# Patient Record
Sex: FEMALE | Race: WHITE | Employment: FULL TIME | URBAN - METROPOLITAN AREA
[De-identification: names, ages, dates, MRNs, and addresses within clinical notes are randomized per-mention and may not be internally consistent; named-entity substitution may affect disease eponyms.]

---

## 2019-12-08 ENCOUNTER — HOSPITAL ENCOUNTER (EMERGENCY)
Facility: CLINIC | Age: 49
Discharge: HOME | End: 2019-12-08
Attending: EMERGENCY MEDICINE | Admitting: EMERGENCY MEDICINE
Payer: COMMERCIAL

## 2019-12-08 VITALS
WEIGHT: 118 LBS | HEART RATE: 88 BPM | DIASTOLIC BLOOD PRESSURE: 68 MMHG | HEIGHT: 61 IN | BODY MASS INDEX: 22.28 KG/M2 | RESPIRATION RATE: 18 BRPM | TEMPERATURE: 98.3 F | SYSTOLIC BLOOD PRESSURE: 123 MMHG | OXYGEN SATURATION: 100 %

## 2019-12-08 DIAGNOSIS — R42 DIZZINESS: ICD-10-CM

## 2019-12-08 DIAGNOSIS — R10.84 ABDOMINAL PAIN, GENERALIZED: ICD-10-CM

## 2019-12-08 LAB
ANION GAP SERPL CALCULATED.3IONS-SCNC: 7 MMOL/L (ref 3–14)
BASOPHILS # BLD AUTO: 0 10E9/L (ref 0–0.2)
BASOPHILS NFR BLD AUTO: 0.5 %
BUN SERPL-MCNC: 14 MG/DL (ref 7–30)
CALCIUM SERPL-MCNC: 8.5 MG/DL (ref 8.5–10.1)
CHLORIDE SERPL-SCNC: 107 MMOL/L (ref 94–109)
CO2 SERPL-SCNC: 24 MMOL/L (ref 20–32)
CREAT SERPL-MCNC: 0.85 MG/DL (ref 0.52–1.04)
D DIMER PPP FEU-MCNC: 0.4 UG/ML FEU (ref 0–0.5)
DIFFERENTIAL METHOD BLD: NORMAL
EOSINOPHIL # BLD AUTO: 0.1 10E9/L (ref 0–0.7)
EOSINOPHIL NFR BLD AUTO: 1.7 %
ERYTHROCYTE [DISTWIDTH] IN BLOOD BY AUTOMATED COUNT: 12.5 % (ref 10–15)
GFR SERPL CREATININE-BSD FRML MDRD: 80 ML/MIN/{1.73_M2}
GLUCOSE SERPL-MCNC: 96 MG/DL (ref 70–99)
HCT VFR BLD AUTO: 40.8 % (ref 35–47)
HGB BLD-MCNC: 13.8 G/DL (ref 11.7–15.7)
IMM GRANULOCYTES # BLD: 0 10E9/L (ref 0–0.4)
IMM GRANULOCYTES NFR BLD: 0.2 %
LYMPHOCYTES # BLD AUTO: 2.2 10E9/L (ref 0.8–5.3)
LYMPHOCYTES NFR BLD AUTO: 37.2 %
MCH RBC QN AUTO: 29.2 PG (ref 26.5–33)
MCHC RBC AUTO-ENTMCNC: 33.8 G/DL (ref 31.5–36.5)
MCV RBC AUTO: 86 FL (ref 78–100)
MONOCYTES # BLD AUTO: 0.5 10E9/L (ref 0–1.3)
MONOCYTES NFR BLD AUTO: 7.8 %
NEUTROPHILS # BLD AUTO: 3.2 10E9/L (ref 1.6–8.3)
NEUTROPHILS NFR BLD AUTO: 52.6 %
NRBC # BLD AUTO: 0 10*3/UL
NRBC BLD AUTO-RTO: 0 /100
PLATELET # BLD AUTO: 298 10E9/L (ref 150–450)
POTASSIUM SERPL-SCNC: 3.5 MMOL/L (ref 3.4–5.3)
RBC # BLD AUTO: 4.73 10E12/L (ref 3.8–5.2)
SODIUM SERPL-SCNC: 138 MMOL/L (ref 133–144)
WBC # BLD AUTO: 6 10E9/L (ref 4–11)

## 2019-12-08 PROCEDURE — 80048 BASIC METABOLIC PNL TOTAL CA: CPT | Performed by: EMERGENCY MEDICINE

## 2019-12-08 PROCEDURE — 99284 EMERGENCY DEPT VISIT MOD MDM: CPT | Mod: 25

## 2019-12-08 PROCEDURE — 93005 ELECTROCARDIOGRAM TRACING: CPT

## 2019-12-08 PROCEDURE — 85379 FIBRIN DEGRADATION QUANT: CPT | Performed by: EMERGENCY MEDICINE

## 2019-12-08 PROCEDURE — 85025 COMPLETE CBC W/AUTO DIFF WBC: CPT | Performed by: EMERGENCY MEDICINE

## 2019-12-08 ASSESSMENT — ENCOUNTER SYMPTOMS
NAUSEA: 1
CONFUSION: 1
ABDOMINAL PAIN: 1
DYSURIA: 0
FREQUENCY: 0
SHORTNESS OF BREATH: 0
BLOOD IN STOOL: 0
DIZZINESS: 1
VOMITING: 1

## 2019-12-08 ASSESSMENT — MIFFLIN-ST. JEOR: SCORE: 1102.62

## 2019-12-08 NOTE — ED AVS SNAPSHOT
Emergency Department  [address] [place] [address]  Phone:  [phone]  Fax:  [phone]                                    Carolin Atkinson   MRN: [id number]    Department:   Emergency Department   Date of Visit:  12/8/2019           After Visit Summary Signature Page    I have received my discharge instructions, and my questions have been answered. I have discussed any challenges I see with this plan with the nurse or doctor.    ..........................................................................................................................................  Patient/Patient Representative Signature      ..........................................................................................................................................  Patient Representative Print Name and Relationship to Patient    ..................................................               ................................................  Date                                   Time    ..........................................................................................................................................  Reviewed by Signature/Title    ...................................................              ..............................................  Date                                               Time          22EPIC Rev 08/18

## 2019-12-09 LAB — INTERPRETATION ECG - MUSE: NORMAL

## 2019-12-09 NOTE — ED PROVIDER NOTES
History     Chief Complaint:  Abdominal Pain      HPI   Carolin Atkinson is a 49 year old female, with a history of ulcerative colitis diagnosed in  s/p total colectomy with ileostomy in , who presents with abdominal pain which has been worsening over the last couple of years, becoming more frequent recently. Patient was in remission after her surgery with no other treatment until about two years ago. Recently, patient has had large amounts of output in her ileostomy bag which relieves the pain. She had an episode of confusion and dizziness tonight which prompted her visit. This has happened about four times in the last two months. She also endorses episodic nausea and vomiting which began over the last couple of months. Her colorectal surgeon ordered a MRI at clinic visit 1.5 weeks ago. She did cough tonight and produced a small amount of blood. She denies chest pain, shortness of breath, urinary changes and blood in her stool.     Cardiac Risk Factors   Sex: Female   Tobacco: Negative   Hypertension: Negative  Diabetes: Negative  Hyperlipidemia: Negative  Family History: Negative    PE/DVT Risk Factors   Personal History: Negative  Recent Travel: Negative   Recent Surgery/Hospitalization: Negative  Tobacco: Negative  Family History: Negative  Hormone Use: Positive  Cancer: Negative  Trauma: Negative      Allergies:  Erythromycin      Medications:    The patient is not currently taking any prescribed medications.     Past Medical History:    Arrhythmia (LBBB)  Ileostomy present  Ulcerative colitis     Past Surgical History:        Family History:    History reviewed. No pertinent family history.    Social History:  Presents to the ED by herself.   Tobacco Use: Never  Alcohol Use: No  PCP: Physician No Ref-Primary  Marital Status:   [2]     Review of Systems   Respiratory: Negative for shortness of breath.    Cardiovascular: Negative for chest pain.   Gastrointestinal: Positive for  "abdominal pain, nausea and vomiting. Negative for blood in stool.   Genitourinary: Negative for dysuria, frequency and urgency.   Neurological: Positive for dizziness.   Psychiatric/Behavioral: Positive for confusion.     Physical Exam     Patient Vitals for the past 24 hrs:   BP Temp Temp src Pulse Heart Rate Resp SpO2 Height Weight   12/08/19 2300 131/74 -- -- 82 -- -- 100 % -- --   12/08/19 2245 127/70 -- -- 84 -- -- 99 % -- --   12/08/19 2201 (!) 145/63 98.3  F (36.8  C) Temporal 89 89 16 99 % 1.549 m (5' 1\") 53.5 kg (118 lb)       Physical Exam  SKIN:  Warm, dry.    HEMATOLOGIC/IMMUNOLOGIC/LYMPHATIC:  No pallor.  EYES:  Conjunctivae normal.  CARDIOVASCULAR:  Regular rate and rhythm.  No murmur or rub.  No JVD.  RESPIRATORY:  No respiratory distress, breath sounds equal and normal.  GASTROINTESTINAL:  Soft non-tender abdomen with active bowel sounds.  No distension.  No abdominal mass.  MUSCULOSKELETAL: Normal body habitus.  NEUROLOGIC:  Alert, conversant.  PSYCHIATRIC:  Normal mood.    Emergency Department Course     ECG:  @ 2259  Indication: Abdominal pain  Vent. Rate 91 bpm. NM interval 176 ms. QRS duration 118 ms. QT/QTc 404/496 ms. P-R-T axis 61 -23 79.   Normal sinus rhythm. Anteroseptal infarct, age undetermined. Abnormal ECG.  No significant change when compared to previous ECG from 3/18/12   Read @ 2259 by Dr. Matute.     Imaging:  Radiology findings were communicated with the patient who voiced understanding of the findings.    The above imaging workup was performed.     Laboratory:  Laboratory findings were communicated with the patient who voiced understanding of the findings.    CBC:  WBC 6.0, HGB 13.8, , otherwise WNL  BMP: WNL (Creatinine 0.85)  D-Dimer: 0.4     Emergency Department Course:  Past medical records, nursing notes, and vitals reviewed.    2212: I performed an exam of the patient as documented above.   IV was inserted and blood was drawn for laboratory testing, results " "above.  2337: I rechecked the patient and discussed the results of her workup thus far.     Findings and plan explained to the Patient. Patient discharged home with instructions regarding supportive care, medications, and reasons to return. The importance of close follow-up was reviewed.     I personally reviewed the laboratory results with the Patient and answered all related questions prior to discharge.     Impression & Plan     Medical Decision Making:  This patient presents with largely subacute symptoms with episodic abdominal pain that clears when she had ostomy output. Her surgeon, Dr. Fields, thinks may have a stricture somewhere within the small bowel generating this. The exam of her abdomen is quite reassuring and she has normal vitals. I doubted obstruction. I did not think abdominal imaging was necessary at this time. Otherwise she is having episodes of lightheadedness and \"confusion\" so EKG performed which was reassuring. D Dimer also performed with respect to PE which proved negative so I did not pursue PE further. Metabolites normal and no leukocytosis. She feels well enough for discharge and follow up on Wednesday for her MRI. Advised follow up for any concerns in the meantime.      Discharge Diagnosis:    ICD-10-CM    1. Abdominal pain, generalized R10.84        Disposition:  Discharged to home.      Scribe Disclosure:  I, Keshajodie Rizzo, am serving as a scribe at 10:12 PM on 12/8/2019 to document services personally performed by Robb Matute MD based on my observations and the provider's statements to me.     Robb Matute MD  12/08/19 1485    "

## 2020-03-20 ENCOUNTER — HOSPITAL ENCOUNTER (EMERGENCY)
Facility: CLINIC | Age: 50
Discharge: HOME | End: 2020-03-21
Attending: EMERGENCY MEDICINE | Admitting: EMERGENCY MEDICINE
Payer: COMMERCIAL

## 2020-03-20 ENCOUNTER — APPOINTMENT (OUTPATIENT)
Dept: GENERAL RADIOLOGY | Facility: CLINIC | Age: 50
End: 2020-03-20
Attending: EMERGENCY MEDICINE
Payer: COMMERCIAL

## 2020-03-20 VITALS — DIASTOLIC BLOOD PRESSURE: 68 MMHG | SYSTOLIC BLOOD PRESSURE: 124 MMHG | HEART RATE: 94 BPM | OXYGEN SATURATION: 97 %

## 2020-03-20 DIAGNOSIS — J11.1 INFLUENZA-LIKE ILLNESS: ICD-10-CM

## 2020-03-20 PROCEDURE — 99283 EMERGENCY DEPT VISIT LOW MDM: CPT

## 2020-03-20 PROCEDURE — 71045 X-RAY EXAM CHEST 1 VIEW: CPT

## 2020-03-20 ASSESSMENT — ENCOUNTER SYMPTOMS
MYALGIAS: 1
FEVER: 1
COUGH: 1
SHORTNESS OF BREATH: 1

## 2020-03-20 NOTE — ED AVS SNAPSHOT
Emergency Department  [address] [place] [address]  Phone:  [phone]  Fax:  [phone]                                    Carolin Atkinson   MRN: [id number]    Department:   Emergency Department   Date of Visit:  3/20/2020           After Visit Summary Signature Page    I have received my discharge instructions, and my questions have been answered. I have discussed any challenges I see with this plan with the nurse or doctor.    ..........................................................................................................................................  Patient/Patient Representative Signature      ..........................................................................................................................................  Patient Representative Print Name and Relationship to Patient    ..................................................               ................................................  Date                                   Time    ..........................................................................................................................................  Reviewed by Signature/Title    ...................................................              ..............................................  Date                                               Time          22EPIC Rev 08/18

## 2020-03-20 NOTE — LETTER
March 21, 2020      To Whom It May Concern:      Carolin Atkinson was seen in our Emergency Department today, 03/21/20.  Please allow her to be away from work until she is fever free.    Sincerely,        Chandana Bowen MD

## 2020-03-21 NOTE — DISCHARGE INSTRUCTIONS
Please use the information at the end of this document to sign up for [facility] where you can get your results and a message about those results sent to you through the [employer] application. If you do not have mychart we will call you with your results but it may take longer.    Regardless of if you have been tested or not:  Patient who have symptoms (cough, fever, or shortness of breath), need to isolate for 7 days from when symptoms started OR 72 hours after fever resolves (without fever reducing medications) AND improvement of respiratory symptoms (whichever is longer).    Isolate yourself at home (in own room/own bathroom if possible)  Do Not allow any visitors  Do Not go to work or school  Do Not go to Faith,  centers, shopping, or other public places.  Do Not shake hands.  Avoid close and intimate contact with others (hugging, kissing).  Follow CDC recommendations for household cleaning of frequently touched services.     After the initial 7 days, continue to isolate yourself from household members as much as possible. To continue decrease the risk of community spread and exposure, you and any members of your household should limit activities in public for 14 days after starting home isolation.     You can reference the following CDC link for helpful home isolation/care tips:  https://www.cdc.gov/coronavirus/2019-ncov/downloads/10Things.pdf    Protect Others:  Cover Your Mouth and Nose with a mask, disposable tissue or wash cloth to avoid spreading germs to others.  Wash your hands and face frequently with soap and water    Call Back If: Breathing difficulty develops or you become worse.    For more information about COVID19 and options for caring for yourself at home, please visit the CDC website at https://www.cdc.gov/coronavirus/2019-ncov/about/steps-when-sick.html  For more options for care at [facility], please visit our website at  [email or web address]

## 2020-03-21 NOTE — ED PROVIDER NOTES
History     Chief Complaint:  Cough, Fever, Shortness of Breath     The history is provided by the patient.      Carolin Atkinson is a 50 year old female who presents for evaluation of intermittent fever and cough over the last nine days. The patient states that her cough has been dry and that she also had generalized body aches during this time. She was seen at an Urgent Care facility five days ago where a chest x-ray was performed which was negative and she was placed on a prednisone taper, which she is still currently on. She states that since that time, she has felt increasingly short of breath and is now unable to exercise due to worsening shortness of breath on exertion. She presents today primarily concerned for her worsening shortness of breath.     Allergies:  Erythromycin     Medications:    Prednisone    Past Medical History:    Ileostomy present  Ulcerative colitis     Past Surgical History:     section    Family History:    History reviewed. No pertinent family history.    Social History:  The patient presents to the ED alone.  Smoking Status: Never Smoker  Alcohol Use: No  Drug Use: No  PCP: No Ref-Primary, Physician     Review of Systems   Constitutional: Positive for fever.   Respiratory: Positive for cough and shortness of breath.    Musculoskeletal: Positive for myalgias (generalized).   All other systems reviewed and are negative.      Physical Exam     Patient Vitals for the past 24 hrs:   BP Pulse SpO2   / 2335 124/68 94 97 %       Physical Exam  General:  Alert, nontoxic in appearance  CV:  Appears well perfused  Lungs:  No obvious respiratory distress  Neuro:  Speaking clearly, no slurred speech  MSK:  Ambulatory      Emergency Department Course     Imaging:  Radiology findings were communicated with the patient who voiced understanding of the findings.    XR Chest Port 1 View:  Stable cardiomediastinal silhouette. No focal consolidation or pleural effusion. No vascular  congestion. Nodular density adjacent to the left cardiac border could be vascular confluence. Short-term PA follow-up suggested to exclude nodule.  As per radiology.     Emergency Department Course:  Past medical records, nursing notes, and vitals reviewed.    2323 I performed an exam of the patient as documented above.     Portable chest x-ray was used at the patient's bedside, see results above.     0045 I rechecked the patient and discussed the results of her workup thus far. At this point I feel that the patient is safe for discharge.    Findings and plan explained to the patient. Patient discharged home with instructions regarding supportive care, medications, and reasons to return. The importance of close follow-up was reviewed.     I personally reviewed the imaging results with the patient and answered all related questions prior to discharge.     Impression & Plan     Medical Decision Making:  Carolin Atkinson is a 50 year old female who presents due to intermittent fever, cough and body aches over the last 9 days.  She has actually been seen at urgent care 5 days ago with a negative chest x-ray but given a prednisone taper.  She has since had some worsening shortness of breath, so she came to the ER.  Patient's oxygen saturations were appropriate and when she was resting in bed her pulse was in the 80s to 90s.  She does not have significant risk factors for a PE, and her clinical symptoms sound consistent with an influenza-like illness.  I did discuss with the patient that unfortunately at this time we are unable to test for influenza or coronavirus.  Given she had some worsening symptoms, did obtain a chest x-ray which not show any signs of pneumonia.  Given the appropriate oxygen saturations in this otherwise healthy woman, I felt that she is appropriate for continued outpatient management.  Recommended continued supportive care and isolation.    Diagnosis:    ICD-10-CM    1. Influenza-like illness  R69         Disposition:  Discharged to home.    Scribe Disclosure:  I, Genaro Toribio, am serving as a scribe at 11:29 PM on 3/20/2020 to document services personally performed by Chandana Bowen MD based on my observations and the provider's statements to me.      Chandana Bowen MD  03/21/20 0521

## 2020-03-21 NOTE — ED TRIAGE NOTES
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 She has a cough, fever and shortness of breath.

## 2021-04-14 ENCOUNTER — HOSPITAL ENCOUNTER (EMERGENCY)
Facility: CLINIC | Age: 51
Discharge: HOME | End: 2021-04-14
Attending: EMERGENCY MEDICINE | Admitting: EMERGENCY MEDICINE
Payer: COMMERCIAL

## 2021-04-14 VITALS
RESPIRATION RATE: 16 BRPM | HEIGHT: 60 IN | SYSTOLIC BLOOD PRESSURE: 151 MMHG | OXYGEN SATURATION: 100 % | HEART RATE: 85 BPM | BODY MASS INDEX: 25.52 KG/M2 | TEMPERATURE: 96.4 F | WEIGHT: 130 LBS | DIASTOLIC BLOOD PRESSURE: 68 MMHG

## 2021-04-14 DIAGNOSIS — G43.109 OCULAR MIGRAINE: ICD-10-CM

## 2021-04-14 PROCEDURE — 99282 EMERGENCY DEPT VISIT SF MDM: CPT

## 2021-04-14 ASSESSMENT — ENCOUNTER SYMPTOMS
NAUSEA: 1
CHILLS: 1
NUMBNESS: 0
MYALGIAS: 1
HEADACHES: 1
WEAKNESS: 0

## 2021-04-14 ASSESSMENT — MIFFLIN-ST. JEOR: SCORE: 1131.18

## 2021-04-14 NOTE — DISCHARGE INSTRUCTIONS
As we discussed, you do likely have an ocular migraine, based on your symptoms.  Specifically, the shiny lights and angles that you see are called scintillating scotomas, and are very common for migraines.  You have politely refused any treatment for your migraine today, and I think that is reasonable.  Come back to the ER immediately with any new concerns, specifically any leg swelling, any numbness or tingling, or any new symptoms of any kind.  Also please follow with your regular doctor in 3 days least by a virtual visit for a checkup.

## 2021-04-14 NOTE — ED TRIAGE NOTES
Atypical migraine; pain location, pain description and nausea different with this migraine.  H/o ocular migraines in the past, patient typically does not need treatment other than OTC medications.  Patient received Baudilio & Baudilio vaccination on Friday.

## 2021-04-14 NOTE — ED PROVIDER NOTES
History   Chief Complaint:  Headache       The history is provided by the patient.      Carolin Palomares is a 51 year old female with history of migraines and ulcerative colitis who presents with an unusual migraine last night. She states that she developed an ocular migraine she describes as flashing lights in both eyes that lasted for about 30 minutes. She has been taking Advil but no prescribed medication for her migraines. The patient notes that her usual migraines hurt more but this episode was less painful with increased visual symptoms. She adds that she received the Baudilio and Baudilio vaccine 5 days ago. Few days after she felt myalgias, nausea, and chills. She called the nurse line due to an email sent from [employer] and her new recent symptoms who recommended evaluation. She denies any extremity numbness, weakness, other visual disturbances or other complaints.     Review of Systems   Constitutional: Positive for chills.   Eyes: Positive for visual disturbance.   Gastrointestinal: Positive for nausea.   Musculoskeletal: Positive for myalgias.   Neurological: Positive for headaches. Negative for weakness and numbness.   All other systems reviewed and are negative.      Allergies:  Erythromycin  Acyclovir  Albuterol  Oseltamivir  Sulfa antibiotics    Medications:  The patient is not currently taking any prescribed medications.    Past Medical History:    Ileostomy present  Ulcerative colitis  Ovarian cysts  Chronic fibrocystic breast disease  Migraines    Past Surgical History:     section  Colostomy  [place]ler teeth extraction  Ileostomy  Colectomy    Family History:    Father: Heart disease, stroke  Mother: Hypertension, hyperlipidemia  Brother: Heart disease, stroke  Sister: Cancer, stroke, thyroid disease    Social History:  Patient presents to the ED alone.  Works at an animal ED.    Physical Exam     Patient Vitals for the past 24 hrs:   BP Temp Temp src Pulse Resp SpO2 Height Weight   21  0701 -- -- -- 85 -- 100 % -- --   04/14/21 0649 (!) 151/68 96.4  F (35.8  C) Temporal 136 16 100 % 1.524 m (5') 59 kg (130 lb)       Physical Exam  Vitals: reviewed by me  General: Pt seen on [facility], cooperative, and alert to conversation  Eyes: Tracking well, clear conjunctiva BL  ENT: MMM, midline trachea.   Lungs:   No tachypnea, no accessory muscle use. No respiratory distress.   CV: Rate as above, regular rhythm.    MSK: no peripheral edema or joint effusion.  No evidence of trauma  Skin: No rash, normal turgor and temperature  Neuro: Clear speech and no facial droop.  Moving all extremity spontaneously, following all commands.   Psych: Not RIS, no e/o AH/VH      Emergency Department Course     Emergency Department Course:    Reviewed:  I reviewed nursing notes, vitals, past medical history and care everywhere    Assessments:  0654 I obtained history and examined the patient as noted above.     Disposition:  The patient was discharged to home.     Impression & Plan     Medical Decision Making:  Carolin Palomares is a very pleasant 51 year old female who presents to the Emergency Department for what appears to be an occular migraine. I believe this diagnosis is supported by a history of similar headaches, the waxing and waning profile of her migraine, and clearly describes scintillating scotomas for the last several days. Unclear if this is related to the vaccine or not but she has no traditional side affects of the J & J vaccine specifically no evidence of DVT, blood clots, no chest pain or neurologic symptoms of any kind. I did offer medication for her migraine but she tells me this is extremely mild, she is only her because the nurses line told her to come here. She did not want any medication to help with her migraine, again, as she feels she can take care of this at home. Her main concern was to see if this was a vaccine reaction and if there was anything to be done about it. At this point  it does not seem there is any clear association. She understands that she needs to monitor her symptoms and come back if she develops any changes. She feels comfortable with this plan, appears to be reliable and states that she will come back to the ER with any worsening symptoms. We did recommend a virtual visit with her primary care doctor in the coming days.     Diagnosis:    ICD-10-CM    1. Ocular migraine  G43.109        Scribe Disclosure:  I, David Meier, am serving as a scribe at 6:57 AM on 4/14/2021 to document services personally performed by Juanito Faye MD based on my observations and the provider's statements to me.                Juanito Faye MD  04/14/21 0273

## 2024-12-19 ENCOUNTER — LAB REQUISITION (OUTPATIENT)
Dept: LAB | Facility: CLINIC | Age: 54
End: 2024-12-19
Payer: COMMERCIAL

## 2024-12-19 DIAGNOSIS — R21 RASH AND OTHER NONSPECIFIC SKIN ERUPTION: ICD-10-CM

## 2024-12-19 PROCEDURE — 87529 HSV DNA AMP PROBE: CPT | Mod: ORL | Performed by: DERMATOLOGY

## 2024-12-19 PROCEDURE — 87798 DETECT AGENT NOS DNA AMP: CPT | Mod: ORL | Performed by: DERMATOLOGY

## 2024-12-20 LAB
HSV1 DNA SPEC QL NAA+PROBE: NOT DETECTED
HSV2 DNA SPEC QL NAA+PROBE: NOT DETECTED
SPECIMEN TYPE: NORMAL
SPECIMEN TYPE: NORMAL
VZV DNA SPEC QL NAA+PROBE: NOT DETECTED